# Patient Record
(demographics unavailable — no encounter records)

---

## 2019-09-02 NOTE — ED
Eye Problem HPI





- General


Chief complaint: Eye Problems


Stated complaint: foreign body rt eye


Time Seen by Provider: 09/02/19 13:07


Source: patient


Mode of arrival: ambulatory


Limitations: no limitations





- History of Present Illness


Initial comments: 





Patient is a 39-year-old male presenting to the emergency Department with 

complaints of right eye irritation and possible foreign object 3 days.  Patient

states he was working on a car and believes some debris and a possible metal 

shaving fell into his right eye.  Patient thought he would be able to flush it 

out over some time on his own, however it is not getting better.  Patient states

his eye feels very irritated and having clear drainage.  Patient admits to some 

very mild blurriness from the right eye on occasion.  Patient denies wearing 

contacts.  Patient has no other complaints at this time.





Arrival to ER, vital signs stable, afebrile.





- Related Data


                                  Previous Rx's











 Medication  Instructions  Recorded


 


Erythromycin Ophth Oint [Romycin 1 applic RIGHT EYE QID 5 Days #1 09/02/19





Ophth Oint] tube 











                                    Allergies











Allergy/AdvReac Type Severity Reaction Status Date / Time


 


No Known Allergies Allergy   Verified 09/02/19 12:19














Review of Systems


ROS Statement: 


Those systems with pertinent positive or pertinent negative responses have been 

documented in the HPI.





ROS Other: All systems not noted in ROS Statement are negative.





Past Medical History


Past Medical History: No Reported History


History of Any Multi-Drug Resistant Organisms: None Reported


Past Surgical History: Orthopedic Surgery


Past Psychological History: No Psychological Hx Reported


Smoking Status: Never smoker


Past Alcohol Use History: None Reported


Past Drug Use History: Marijuana





General Exam





- General Exam Comments


Initial Comments: 





GENERAL: 


Well-appearing, well-nourished and in no acute distress.





HEAD: 


Atraumatic, normocephalic.








ENT: 


TMs normal, nares patent, oropharynx clear without exudates.  Moist mucous 

membranes.





NECK: 


Normal range of motion, supple without lymphadenopathy or JVD.





LUNGS:


 Breath sounds clear to auscultation bilaterally and equal.  No wheezes rales or

rhonchi.





HEART:


Regular rate and rhythm without murmurs, rubs or gallops.





ABDOMEN: 


Soft, nontender, normoactive bowel sounds.  No guarding, no rebound.  No masses 

appreciated.





: Deferred 





EXTREMITIES: 


Normal range of motion, no pitting or edema.  No clubbing or cyanosis.





NEUROLOGICAL: 


Cranial nerves II through XII grossly intact.  Normal speech, normal gait.





PSYCH:


Normal mood, normal affect.





SKIN:


 Warm, Dry, normal turgor, no rashes or lesions noted.


Limitations: no limitations


Eye exam: Present: PERRL, EOMI


  ** Expanded


Eyelids: Normal Inspection: Right


Pupils: Regular, Round: Bilateral


Sclera/Conjunctival: Normal Inspection: Left, Injection: Right, Foreign Body: 

Right (3 o'clock position of the cornea.)


Anterior chamber: Normal Inspection: Bilateral





Course


                                   Vital Signs











  09/02/19 09/02/19





  12:16 15:37


 


Temperature 97.9 F 97.9 F


 


Pulse Rate 86 86


 


Respiratory 18 18





Rate  


 


Blood Pressure 119/80 119/80


 


O2 Sat by Pulse 98 98





Oximetry  














Procedures





- Procedures


Initial comment: 





Patient was given a few drops of proparacaine to the right eye.  The eye Tremmel

tool was used to remove debris from the right cornea.  Fluorescein stain to the 

right eye reveals an abrasion to 3 o'clock position of the right cornea.  No 

ulcers are seen.  Patient tolerated procedure well.





Medical Decision Making





- Medical Decision Making





Patient is a 39-year-old male presenting with right eye irritation and foreign 

body 3 days.  Patient believes there is a metal shaving from the car he was 

working in his right eye.  On exam his right eye is erythematous and draining 

clear fluid. Patient was given a few drops of proparacaine to the right eye.  

The eye Tremmel tool was used to remove debris from the right cornea.  

Fluorescein stain to the right eye reveals an abrasion to 3 o'clock position of 

the right cornea.  No ulcers are seen.  Patient tolerated procedure well.  

Patient was given a Tylenol 3 starter pack as well as antibiotic for the right 

eye.  Discussed with patient follow-up with ophthalmology in 2-3 days.  Return 

parameters were discussed with the patient he verbalizes understanding.  Patient

is stable for discharge at this time.  Case discussed with Dr. Lake. 





Disposition


Clinical Impression: 


 Corneal abrasion, Corneal foreign body





Disposition: HOME SELF-CARE


Condition: Stable


Instructions (If sedation given, give patient instructions):  Corneal Abrasion 

(ED), Eye Foreign Body (ED)


Additional Instructions: 


Please return to the Emergency Department if symptoms worsen or any other 

concerns.


Use antibiotic as prescribed


Follow up with ophthalmology in 2-3 days.


Prescriptions: 


Erythromycin Ophth Oint [Romycin Ophth Oint] 1 applic RIGHT EYE QID 5 Days #1 

tube


Is patient prescribed a controlled substance at d/c from ED?: No


Referrals: 


Kelly Keller MD [Primary Care Provider] - 1-2 days


Kinjal Thomas MD [STAFF PHYSICIAN] - 1-2 days